# Patient Record
Sex: FEMALE | Race: ASIAN | Employment: FULL TIME | URBAN - METROPOLITAN AREA
[De-identification: names, ages, dates, MRNs, and addresses within clinical notes are randomized per-mention and may not be internally consistent; named-entity substitution may affect disease eponyms.]

---

## 2023-03-31 ENCOUNTER — OFFICE VISIT (OUTPATIENT)
Age: 53
End: 2023-03-31

## 2023-03-31 VITALS — BODY MASS INDEX: 25.13 KG/M2 | HEIGHT: 60 IN | WEIGHT: 128 LBS

## 2023-03-31 DIAGNOSIS — S93.401A SPRAIN OF RIGHT ANKLE, UNSPECIFIED LIGAMENT, INITIAL ENCOUNTER: Primary | ICD-10-CM

## 2023-03-31 DIAGNOSIS — M25.562 PAIN IN BOTH KNEES, UNSPECIFIED CHRONICITY: Primary | ICD-10-CM

## 2023-03-31 DIAGNOSIS — M25.561 PAIN IN BOTH KNEES, UNSPECIFIED CHRONICITY: Primary | ICD-10-CM

## 2023-03-31 RX ORDER — VALSARTAN AND HYDROCHLOROTHIAZIDE 160; 12.5 MG/1; MG/1
TABLET, FILM COATED ORAL
COMMUNITY

## 2023-03-31 RX ORDER — ESTRADIOL 0.5 MG/1
TABLET ORAL
COMMUNITY

## 2023-03-31 RX ORDER — MEDROXYPROGESTERONE ACETATE 10 MG/1
TABLET ORAL
COMMUNITY

## 2023-03-31 RX ORDER — PROGESTERONE 100 MG/1
CAPSULE ORAL
COMMUNITY

## 2023-03-31 NOTE — PATIENT INSTRUCTIONS
exactly as directed. If the doctor gave you a prescription medicine for pain, take it as prescribed. If you are not taking a prescription pain medicine, ask your doctor if you can take an over-the-counter medicine. If you have been given ankle exercises to do at home, do them exactly as instructed. These can promote healing and help prevent lasting weakness. When should you call for help? Call your doctor now or seek immediate medical care if:    Your pain is getting worse. Your swelling is getting worse. Your splint feels too tight or you are unable to loosen it. Watch closely for changes in your health, and be sure to contact your doctor if:    You are not getting better after 1 week. Where can you learn more? Go to http://www.woods.com/ and enter S771 to learn more about \"Ankle Sprain: Care Instructions. \"  Current as of: March 9, 2022               Content Version: 13.5  © 9721-7042 [employer]. Care instructions adapted under license by [facility] ([facility]). If you have questions about a medical condition or this instruction, always ask your healthcare professional. [address] any warranty or liability for your use of this information.

## 2023-03-31 NOTE — LETTER
RX/DWO/POD  DOS: 3/31/2023  Labrent BERRIOS Sheridan          1970   MVB#[id number]                        Desi Rivas                                                                                           NPI# [id number]  Wrist                     Foot/Ankle                         Knee                Wrist Splint            Cam Boot                         Knee Immobilizer    Thumb Spica         Lace Up Ankle Strap       J Sleeve                                  Night Time Splint             T- Scope ROM Brace          Short Runner                                                                           OA Brace   SHOULDER    Sling    Sling w/ Abduction Pillow    ELBOW    Elbow T-Scope ROM Brace    Back    Back Brace    CRUTCHES    Left    Right    __________ Size              IDC 10 Code:____________    I hereby acknowledge receipt of the above listed equipment. I acknowledge that the equipment is in good working order. I have received instructions on safe and proper use of the equipment, including cleaning and maintenance requirements, to my complete satisfaction. I also understand that this product is not able to be returned and is non refundable. I hereby request that payment of authorized Medicare/ third party insurance benefits be made on my behalf of [address] [place] [address] for assigned claims for any authorized equipment/product furnished by [facility].  Having read the foregoing terms and conditions of the agreement on this page , I do hereby agree to be bound thereby.       _______________________________________         ____________________________  Patient/Legal Harrison Elizondo            Representative Name

## 2023-04-19 DIAGNOSIS — M25.561 PAIN IN BOTH KNEES, UNSPECIFIED CHRONICITY: Primary | ICD-10-CM

## 2023-04-19 DIAGNOSIS — M25.562 PAIN IN BOTH KNEES, UNSPECIFIED CHRONICITY: Primary | ICD-10-CM

## 2023-04-25 ENCOUNTER — HOSPITAL ENCOUNTER (OUTPATIENT)
Facility: HOSPITAL | Age: 53
Setting detail: RECURRING SERIES
Discharge: HOME | End: 2023-04-28
Payer: COMMERCIAL

## 2023-04-25 PROCEDURE — 97110 THERAPEUTIC EXERCISES: CPT

## 2023-04-25 NOTE — PROGRESS NOTES
PHYSICAL / OCCUPATIONAL THERAPY - DAILY TREATMENT NOTE (updated )    Patient Name: Rafia Moore    Date: 2023    : 1970  Insurance: Payor: [employer] / Plan: OPTIMA HMO / Product Type: *No Product type* /      Patient  verified Yes     Visit #   Current / Total 2 12   Time   In / Out 353 425   Pain   In / Out /10 (knees); 2/10 (ankle) 7/10   Subjective Functional Status/Changes: Patient reports her knees hurt on and off throughout the day. Typically, patient doesn't notice any pain if she keep her legs straight, but will notice pain when she bends or moves them. Changes to:  Meds, Allergies, Med Hx, Sx Hx? If yes, update Summary List no       TREATMENT AREA =  Pain in right foot [M79.671]  Pain in both knees [M25.561, M25.562]    OBJECTIVE         Therapeutic Procedures: Tx Min Billable or 1:1 Min (if diff from Tx Min) Procedure, Rationale, Specifics   32  03890 Therapeutic Exercise (timed):  increase ROM, strength, coordination, balance, and proprioception to improve patient's ability to progress to PLOF and address remaining functional goals. (see flow sheet as applicable)     Details if applicable:              Details if applicable:            Details if applicable:            Details if applicable:            Details if applicable:     28  [facility] Totals Reminder: bill using total billable min of TIMED therapeutic procedures (example: do not include dry needle or estim unattended, both untimed codes, in totals to left)  8-22 min = 1 unit; 23-37 min = 2 units; 38-52 min = 3 units; 53-67 min = 4 units; 68-82 min = 5 units   Total Total     [x]  Patient Education billed concurrently with other procedures   [x] Review HEP    [] Progressed/Changed HEP, detail:    [] Other detail:       Objective Information/Functional Measures/Assessment    Performed shortened treatment session today as patient arrived 13 minutes late to appointment.  Patient reporting increased pain with 3 way SLR as well as 4

## 2023-05-01 ENCOUNTER — OFFICE VISIT (OUTPATIENT)
Age: 53
End: 2023-05-01

## 2023-05-01 DIAGNOSIS — M25.562 PAIN IN BOTH KNEES, UNSPECIFIED CHRONICITY: Primary | ICD-10-CM

## 2023-05-01 DIAGNOSIS — M17.0 OSTEOARTHRITIS OF BOTH KNEES, UNSPECIFIED OSTEOARTHRITIS TYPE: ICD-10-CM

## 2023-05-01 DIAGNOSIS — M25.561 PAIN IN BOTH KNEES, UNSPECIFIED CHRONICITY: Primary | ICD-10-CM

## 2023-05-01 RX ORDER — TRIAMCINOLONE ACETONIDE 40 MG/ML
40 INJECTION, SUSPENSION INTRA-ARTICULAR; INTRAMUSCULAR ONCE
Status: COMPLETED | OUTPATIENT
Start: 2023-05-01 | End: 2023-05-01

## 2023-05-01 RX ORDER — LIDOCAINE HYDROCHLORIDE 10 MG/ML
9 INJECTION, SOLUTION INFILTRATION; PERINEURAL ONCE
Status: COMPLETED | OUTPATIENT
Start: 2023-05-01 | End: 2023-05-01

## 2023-05-01 RX ADMIN — LIDOCAINE HYDROCHLORIDE 9 ML: 10 INJECTION, SOLUTION INFILTRATION; PERINEURAL at 15:56

## 2023-05-01 RX ADMIN — TRIAMCINOLONE ACETONIDE 40 MG: 40 INJECTION, SUSPENSION INTRA-ARTICULAR; INTRAMUSCULAR at 15:57

## 2023-05-01 NOTE — PROGRESS NOTES
agree with [address] [place] [address] Nw and ROS and intake form in chart and the record furthermore I have reviewed prior medical record(s) regarding this patients care during this appointment. Review of Systems:   Patient is a pleasant appearing individual, appropriately dressed, well hydrated, well nourished, who is alert, appropriately oriented for age, and in no acute distress with a normal gait and normal affect who does not appear to be in any significant pain. Physical Exam:  Left Knee -Decrease range of motion with flexion, Knee arc of greater than 50 degrees, Some crepitation, Grossly neurovascularly intact, Good cap refill, No skin lesion, Moderate swelling, No gross instability, Some quadriceps weakness, greater than 50 degree arc    Right  Knee -Decrease range of motion with flexion, Knee arc of greater than 50 degrees, Some crepitation, Grossly neurovascularly intact, Good cap refill, No skin lesion, Moderate swelling, No gross instability, Some quadriceps weakness, greater than 50 degree arc     Procedure Documentation:    I discussed in detail the risks, benefits and complications of an injection which included but are not limited to infection, skin reactions, hot swollen joint, and anaphylaxis with the patient. The patient verbalized understanding and gave informed consent for the injection. The patient's knees were flexed to 90° and the skin prepped using sterile alcohol solution. A sterile needle was inserted into the bilateral knee(s) and the mixture of 9 mL Lidocaine 1%, 1 mL Kenalog 40 mg was injected under sterile technique. The needle was withdrawn and the puncture site sealed with a Band-Aid. Technique: Under sterile conditions a [employer] ultrasound unit with a variable frequency (7.0-14.0 MHz) linear transducer was used to localize the placement of needle into the bilateral knee(s) joint. Findings: Successful needle placement for knee injection. Final images were taken and saved for permanent record.

## 2023-05-02 ENCOUNTER — HOSPITAL ENCOUNTER (OUTPATIENT)
Facility: HOSPITAL | Age: 53
Setting detail: RECURRING SERIES
End: 2023-05-02
Payer: COMMERCIAL

## 2023-05-05 ENCOUNTER — HOSPITAL ENCOUNTER (OUTPATIENT)
Facility: HOSPITAL | Age: 53
Setting detail: RECURRING SERIES
End: 2023-05-05
Payer: COMMERCIAL

## 2023-05-08 ENCOUNTER — HOSPITAL ENCOUNTER (OUTPATIENT)
Facility: HOSPITAL | Age: 53
Setting detail: RECURRING SERIES
Discharge: HOME | End: 2023-05-11
Payer: COMMERCIAL

## 2023-05-08 PROCEDURE — 97530 THERAPEUTIC ACTIVITIES: CPT

## 2023-05-08 PROCEDURE — 97110 THERAPEUTIC EXERCISES: CPT

## 2023-05-08 PROCEDURE — 97112 NEUROMUSCULAR REEDUCATION: CPT

## 2023-05-08 NOTE — PROGRESS NOTES
PHYSICAL / OCCUPATIONAL THERAPY - DAILY TREATMENT NOTE (updated )    Patient Name: Laqueta Harada    Date: 2023    : 1970  Insurance: Payor: [employer] / Plan: OPTIMA HMO / Product Type: *No Product type* /      Patient  verified Yes     Visit #   Current / Total 3 12   Time   In / Out 2:24 3:05   Pain   In / Out 5/10 (knees); 3/10 (ankle) 5/10   Subjective Functional Status/Changes: Pt reports she noticed some relief in her knee pain last week after injection but notes on Friday evening she began to notice some swelling on the outside of both of her knees with increased pain. Reports she called out of work today 2' worried wouldn't be able to stand all day. Changes to:  Meds, Allergies, Med Hx, Sx Hx? If yes, update Summary List no       TREATMENT AREA =  Pain in right foot [M79.671]  Pain in both knees [M25.561, M25.562]    OBJECTIVE         Therapeutic Procedures: Tx Min Billable or 1:1 Min (if diff from Tx Min) Procedure, Rationale, Specifics   16  62680 Therapeutic Exercise (timed):  increase ROM, strength, coordination, balance, and proprioception to improve patient's ability to progress to PLOF and address remaining functional goals. (see flow sheet as applicable)     Details if applicable:       15   45658 Therapeutic Activity (timed):  use of dynamic activities replicating functional movements to increase ROM, strength, coordination, balance, and proprioception in order to improve patient's ability to progress to PLOF and address remaining functional goals. (see flow sheet as applicable)       Details if applicable:     10   39575 Neuromuscular Re-Education (timed):  improve balance, coordination, kinesthetic sense, posture, core stability and proprioception to improve patient's ability to develop conscious control of individual muscles and awareness of position of extremities in order to progress to PLOF and address remaining functional goals.  (see flow sheet as applicable)

## 2023-05-08 NOTE — THERAPY RECERTIFICATION
[address] [place] [address] PHYSICAL THERAPY  [facility], [place], [address]  Phone: [phone]   [phone]  PHYSICAL THERAPY PROGRESS NOTE  Patient Name: Eleno Rivera : 1970   Treatment/Medical Diagnosis: Pain in right foot [M79.671]  Pain in both knees [M25.561, M25.562]   Referral Source: Gerson Ramos MD     Date of Initial Visit: 4/10/2023 Attended Visits: 3 Missed Visits: 1     SUMMARY OF TREATMENT  Pt has attended 3 sessions of PT for the tx of R ankle and B knee pain. PT tx has consisted of therex/theract/nmre to improve LE strength/stability, flexibility, ankle proprioception, and dec pain. . In addition pt has been instructed in HEP. Progress has been minimal at this time 2' only having attended 3 total sessions in a 1 month time frame, partially due to awaiting authorization from insurance for treatment. CURRENT STATUS  % improvement: none at this time (pt has only attended IE and 2 follow ups)  Max pain 7/10  Avg pain 5/10  Min pain 3/10    Current improvements: none at this time  Remaining functional limitations: prolonged standing, kneeling, descending>ascending stairs, rising to stand from low surface, walking on unlevel surface    Objective measures:  FUNCTIONAL ASSESSMENT:                 Squat to 60 deg, pain-free, B PFJ crepitus and excessive anterior knee translation/minimal hip hinge               Stairs: reciprocal to ascend with 1 HR, NR to descend with 1 HR, p! To B anterior knee. ROM   ANKLE:  DF R 20 (ant shin pain), L 20.  PF R 68 (p!), L66. INV R 54 (p!), L 45. EV R 18, L 22 deg     STRENGTH   ANKLE: DF R 4+5 (p!), L 5/5. PF R 2+/5, L2+/5. INV R 4+/5 (p!), L 5/5. EV R 4/5 (p!), L 5/5  HIP: flex  abd R 3/5, L 3+/5 (p! Lateral hip); hip ext R 3+/5, L 4-/5. KNEE: Ext R 4+/5, L 4+/5 (p!); flex R 4-/5, L 4-/5.       SHORT TERM GOALS:  Pt will be educated in appropriate HEP to decrease pain, increase ROM, increase strength and

## 2023-05-17 ENCOUNTER — APPOINTMENT (OUTPATIENT)
Facility: HOSPITAL | Age: 53
End: 2023-05-17
Payer: COMMERCIAL